# Patient Record
Sex: FEMALE | Race: ASIAN | URBAN - METROPOLITAN AREA
[De-identification: names, ages, dates, MRNs, and addresses within clinical notes are randomized per-mention and may not be internally consistent; named-entity substitution may affect disease eponyms.]

---

## 2023-04-17 ENCOUNTER — OFFICE VISIT (OUTPATIENT)
Facility: LOCATION | Age: 58
End: 2023-04-17
Payer: COMMERCIAL

## 2023-04-17 DIAGNOSIS — H04.123 DRY EYE SYNDROME OF BILATERAL LACRIMAL GLANDS: ICD-10-CM

## 2023-04-17 DIAGNOSIS — E11.9 TYPE 2 DIABETES MELLITUS WITHOUT COMPLICATIONS: ICD-10-CM

## 2023-04-17 DIAGNOSIS — H16.223 KERATOCONJUNCT SICCA, NOT SPECIFIED AS SJOGREN'S, BILATERAL: ICD-10-CM

## 2023-04-17 DIAGNOSIS — H25.13 AGE-RELATED NUCLEAR CATARACT, BILATERAL: Primary | ICD-10-CM

## 2023-04-17 DIAGNOSIS — H43.813 VITREOUS DEGENERATION, BILATERAL: ICD-10-CM

## 2023-04-17 PROCEDURE — 99204 OFFICE O/P NEW MOD 45 MIN: CPT | Performed by: OPHTHALMOLOGY

## 2023-04-17 RX ORDER — FLUOROMETHOLONE ACETATE 1 MG/ML
0.1 % SUSPENSION/ DROPS OPHTHALMIC
Qty: 5 | Refills: 0 | Status: ACTIVE
Start: 2023-04-17

## 2023-04-17 ASSESSMENT — INTRAOCULAR PRESSURE
OD: 12
OS: 13

## 2023-04-17 NOTE — IMPRESSION/PLAN
Impression: Vitreous degeneration, bilateral: H43.813. Plan: Discussed pathophysiology of PVD, discussed S/S of RD, patient to follow up ASAP with increasing flashes, floaters, curtain or shadow.

## 2023-04-17 NOTE — IMPRESSION/PLAN
Impression: Keratoconjunct sicca, not specified as Sjogren's, bilateral: Y40.314.  Plan: see dry eye plan

## 2023-04-17 NOTE — IMPRESSION/PLAN
Impression: Type 2 diabetes mellitus without complications: T90.0. Plan: Patient has type II diabetes with no complications. Discussed with patient the importance of controlling Blood Sugar and A1c levels. Patient advised to continue care with primary care physician.

## 2023-04-17 NOTE — IMPRESSION/PLAN
Impression: Dry eye syndrome of bilateral lacrimal glands: H04.123. Plan: Start Flarex QID OU Start Refresh Plus QID OU

RTC 3-4 weeks for AR, MR OU, glare OU, MAC/OCT OU, Extended plugs x 4 with Dr. Abdirahman Camarena

## 2023-05-01 ENCOUNTER — OFFICE VISIT (OUTPATIENT)
Facility: LOCATION | Age: 58
End: 2023-05-01
Payer: COMMERCIAL

## 2023-05-01 DIAGNOSIS — H16.223 KERATOCONJUNCT SICCA, NOT SPECIFIED AS SJOGREN'S, BILATERAL: ICD-10-CM

## 2023-05-01 DIAGNOSIS — H04.123 DRY EYE SYNDROME OF BILATERAL LACRIMAL GLANDS: Primary | ICD-10-CM

## 2023-05-01 PROCEDURE — 92134 CPTRZ OPH DX IMG PST SGM RTA: CPT | Performed by: OPHTHALMOLOGY

## 2023-05-01 ASSESSMENT — KERATOMETRY
OS: 46.87
OD: 46.56

## 2023-05-01 ASSESSMENT — INTRAOCULAR PRESSURE
OS: 13
OD: 14

## 2023-05-01 ASSESSMENT — VISUAL ACUITY
OS: 20/25
OD: 20/20

## 2023-05-01 NOTE — IMPRESSION/PLAN
Impression: Keratoconjunct sicca, not specified as Sjogren's, bilateral: U30.513. Plan: see dry eye plan.

## 2023-05-01 NOTE — IMPRESSION/PLAN
Impression: Dry eye syndrome of bilateral lacrimal glands: H04.
placed extended plugs x 4 today. pt tolerated procedure well Plan: Increase PFATs QID and Flarex QID OU Dry eyes account for the patient's complaints. There is no evidence of permanent changes to the cornea. Explained that this condition does not have a cure. The options explained to the patient include topical lubricants, punctal plugs, and laser punctal occlusion. 
RTC 5 weeks for re-eval with Dr. Laverne Rosales

## 2023-06-22 ENCOUNTER — OFFICE VISIT (OUTPATIENT)
Facility: LOCATION | Age: 58
End: 2023-06-22
Payer: COMMERCIAL

## 2023-06-22 DIAGNOSIS — H16.223 KERATOCONJUNCT SICCA, NOT SPECIFIED AS SJOGREN'S, BILATERAL: ICD-10-CM

## 2023-06-22 DIAGNOSIS — H04.123 DRY EYE SYNDROME OF BILATERAL LACRIMAL GLANDS: Primary | ICD-10-CM

## 2023-06-22 PROCEDURE — 99213 OFFICE O/P EST LOW 20 MIN: CPT | Performed by: OPHTHALMOLOGY

## 2023-06-22 RX ORDER — LOTEPREDNOL ETABONATE 3.8 MG/G
0.38 % GEL OPHTHALMIC
Qty: 5 | Refills: 3 | Status: ACTIVE
Start: 2023-06-22

## 2023-06-22 ASSESSMENT — INTRAOCULAR PRESSURE
OD: 14
OS: 14

## 2023-06-22 NOTE — IMPRESSION/PLAN
Impression: Dry eye syndrome of bilateral lacrimal glands: H04.
s/p ext x 4  Plan: Stop Flarex and start Lotemax SM TID OU-sent rx to [facility]. Start lid scrubs QD OU- Optase eyelid cleansing wipes RTC 5 weeks for re-eval with Dr. Kellen Aguilar and to replace punctual plugs silicone plugs BLL and extended plugs BUL

## 2023-06-22 NOTE — IMPRESSION/PLAN
Impression: Keratoconjunct sicca, not specified as Sjogren's, bilateral: H56.144. Plan: see dry eye plan.

## 2025-07-08 ENCOUNTER — HOSPITAL ENCOUNTER (EMERGENCY)
Facility: MEDICAL CENTER | Age: 60
End: 2025-07-08
Attending: EMERGENCY MEDICINE

## 2025-07-08 ENCOUNTER — APPOINTMENT (OUTPATIENT)
Dept: URGENT CARE | Facility: CLINIC | Age: 60
End: 2025-07-08

## 2025-07-08 VITALS
BODY MASS INDEX: 28.27 KG/M2 | OXYGEN SATURATION: 98 % | RESPIRATION RATE: 14 BRPM | WEIGHT: 140.21 LBS | HEIGHT: 59 IN | TEMPERATURE: 97.6 F | SYSTOLIC BLOOD PRESSURE: 142 MMHG | HEART RATE: 84 BPM | DIASTOLIC BLOOD PRESSURE: 78 MMHG

## 2025-07-08 DIAGNOSIS — Z53.21 ELOPED FROM EMERGENCY DEPARTMENT: Primary | ICD-10-CM

## 2025-07-08 PROCEDURE — 302449 STATCHG TRIAGE ONLY (STATISTIC)

## 2025-07-08 NOTE — ED TRIAGE NOTES
"Kiersten Merino Samantha  60 y.o. female  Chief Complaint   Patient presents with    Medication Refill     Amlodipine 10 mg daily  Metformin 500 mg daily  Atorvastatin \"low dose\"        Pt amb to triage with steady gait for above complaint. Pt reports she is traveling from [place] and forgot her med.  Pt is alert and oriented, speaking in full sentences, follows commands and responds appropriately to questions. Not in any apparent distress. Respirations are even and unlabored.  Pt placed in ED Lobby. Pt educated on triage process. Pt encouraged to alert staff for any changes.    "

## 2025-07-08 NOTE — ED PROVIDER NOTES
ED Provider Note    Left prior to being seen by ERP.    Electronically signed by: Yeison Oswald M.D., 7/8/2025 10:37 AM